# Patient Record
Sex: FEMALE | URBAN - METROPOLITAN AREA
[De-identification: names, ages, dates, MRNs, and addresses within clinical notes are randomized per-mention and may not be internally consistent; named-entity substitution may affect disease eponyms.]

---

## 2024-09-26 ENCOUNTER — OFFICE VISIT (OUTPATIENT)
Dept: URGENT CARE | Age: 19
End: 2024-09-26
Payer: COMMERCIAL

## 2024-09-26 VITALS
HEART RATE: 80 BPM | TEMPERATURE: 98.3 F | DIASTOLIC BLOOD PRESSURE: 58 MMHG | RESPIRATION RATE: 18 BRPM | BODY MASS INDEX: 35.82 KG/M2 | OXYGEN SATURATION: 98 % | WEIGHT: 215 LBS | SYSTOLIC BLOOD PRESSURE: 110 MMHG | HEIGHT: 65 IN

## 2024-09-26 DIAGNOSIS — L72.0 EIC (EPIDERMAL INCLUSION CYST): Primary | ICD-10-CM

## 2024-09-26 RX ORDER — SULFAMETHOXAZOLE AND TRIMETHOPRIM 800; 160 MG/1; MG/1
1 TABLET ORAL 2 TIMES DAILY
Qty: 20 TABLET | Refills: 0 | Status: SHIPPED | OUTPATIENT
Start: 2024-09-26 | End: 2024-10-06

## 2024-09-26 RX ORDER — MUPIROCIN 20 MG/G
OINTMENT TOPICAL 3 TIMES DAILY
Qty: 22 G | Refills: 0 | Status: SHIPPED | OUTPATIENT
Start: 2024-09-26 | End: 2024-10-06

## 2024-09-26 ASSESSMENT — PAIN SCALES - GENERAL: PAINLEVEL: 7

## 2024-09-26 NOTE — PROGRESS NOTES
"Subjective   Patient ID: Xiomara Dave is a 19 y.o. female. They present today with a chief complaint of Cyst (Cyst 2 days near left side of ribs). Patient presents with a 2-3 day history of slightly painful cyst on left side of abdomen. No drainage is reported. She denies fevers. She is eating and drinking normally.    History of Present Illness  HPI    Past Medical History  Allergies as of 09/26/2024    (No Known Allergies)       (Not in a hospital admission)       History reviewed. No pertinent past medical history.    History reviewed. No pertinent surgical history.     reports that she has never smoked. She has never used smokeless tobacco.    Review of Systems  Review of Systems                               Objective    Vitals:    09/26/24 1448   BP: 110/58   BP Location: Left arm   Patient Position: Sitting   BP Cuff Size: Large adult   Pulse: 80   Resp: 18   Temp: 36.8 °C (98.3 °F)   TempSrc: Oral   SpO2: 98%   Weight: 97.5 kg (215 lb)   Height: 1.651 m (5' 5\")     Patient's last menstrual period was 09/12/2024 (approximate).    Physical Exam  Constitutional:       General: She is not in acute distress.     Appearance: Normal appearance. She is not ill-appearing.   Cardiovascular:      Pulses: Normal pulses.   Skin:     General: Skin is warm and dry.      Comments: Left lateral abdominal wall with erythematous tender pustule, no warmth or drainage, no surrounding erythema   Neurological:      Mental Status: She is alert.         Procedures    Point of Care Test & Imaging Results from this visit  No results found for this visit on 09/26/24.   No results found.    Diagnostic study results (if any) were reviewed by Mary Bernard MD.    Assessment/Plan   Allergies, medications, history, and pertinent labs/EKGs/Imaging reviewed by Mary Bernard MD.       Orders and Diagnoses  There are no diagnoses linked to this encounter.    Medical Admin Record      Patient disposition: Home    Electronically signed by " Mary Bernard MD  2:51 PM

## 2024-11-23 ENCOUNTER — OFFICE VISIT (OUTPATIENT)
Dept: URGENT CARE | Age: 19
End: 2024-11-23
Payer: COMMERCIAL

## 2024-11-23 ENCOUNTER — ANCILLARY PROCEDURE (OUTPATIENT)
Dept: URGENT CARE | Age: 19
End: 2024-11-23
Payer: COMMERCIAL

## 2024-11-23 VITALS
WEIGHT: 215 LBS | HEART RATE: 90 BPM | DIASTOLIC BLOOD PRESSURE: 77 MMHG | HEIGHT: 65 IN | TEMPERATURE: 99.1 F | SYSTOLIC BLOOD PRESSURE: 144 MMHG | OXYGEN SATURATION: 97 % | RESPIRATION RATE: 18 BRPM | BODY MASS INDEX: 35.82 KG/M2

## 2024-11-23 DIAGNOSIS — M79.671 RIGHT FOOT PAIN: Primary | ICD-10-CM

## 2024-11-23 DIAGNOSIS — M79.671 RIGHT FOOT PAIN: ICD-10-CM

## 2024-11-23 DIAGNOSIS — S93.601A RIGHT FOOT SPRAIN, INITIAL ENCOUNTER: ICD-10-CM

## 2024-11-23 PROCEDURE — 73630 X-RAY EXAM OF FOOT: CPT | Mod: RIGHT SIDE | Performed by: FAMILY MEDICINE

## 2024-11-23 NOTE — PATIENT INSTRUCTIONS
Elevate and rest foot as able   apply ice for 10 minutes several times a day  Ibuprofen or Aleve as discussed  Follow-up with PCP in 7 to 10 days if no improvement

## 2024-11-23 NOTE — PROGRESS NOTES
"Subjective   Patient ID: Xiomara Dave is a 19 y.o. female. They present today with a chief complaint of Foot Pain (Right foot, started x2 days. ).    History of Present Illness  HPI  Patient presents with pain along the lateral edge of her right foot for the past 2 days.  She denies any known injury.  No new exercises or activities.  Patient started a new job in which she is on her feet more.  She does not remember a moment in time when the pain began.  She has rested and elevated her foot and has taken Tylenol and ibuprofen with no relief.  No bruising or swelling has been seen.  No numbness or loss of sensation.  Past Medical History  Allergies as of 11/23/2024    (No Known Allergies)       (Not in a hospital admission)       No past medical history on file.    No past surgical history on file.     reports that she has never smoked. She has never used smokeless tobacco.    Review of Systems  Review of Systems                               Objective    Vitals:    11/23/24 1443   BP: 144/77   BP Location: Right arm   Patient Position: Sitting   Pulse: 90   Resp: 18   Temp: 37.3 °C (99.1 °F)   TempSrc: Oral   SpO2: 97%   Weight: 97.5 kg (215 lb)   Height: 1.651 m (5' 5\")     Patient's last menstrual period was 10/21/2024 (approximate).    Physical Exam  General: Vitals noted, no distress. Afebrile.   Vascular: Right lower extremity warm and dry with good peripheral pulses noted  Extremities: No peripheral edema.  Tenderness with no palpable deformity over lateral dorsal right foot corresponding to proximal fourth metatarsal.  Movement in toes and ankle show full range of motion and 5/5 strength equal bilaterally  Skin: No rash. No bruising or discoloration. No cuts or abrasions  Neuro: No focal neurologic deficits, NIH score of 0.    Procedures    Point of Care Test & Imaging Results from this visit  No results found for this visit on 11/23/24.   No results found.  X-ray right foot shows no fracture or " dislocation  Diagnostic study results (if any) were reviewed by Dung Duong MD.    Assessment/Plan   Allergies, medications, history, and pertinent labs/EKGs/Imaging reviewed by Dung Duong MD.     Medical Decision Making  At time of discharge patient was clinically well-appearing and HDS for outpatient management. The patient and/or family was educated regarding diagnosis, supportive care, OTC and Rx medications. The patient and/or family was given the opportunity to ask questions prior to discharge.  They verbalized understanding of my discussion of the plans for treatment, expected course, indications to return to  or seek further evaluation in ED, and the need for timely follow up as directed.   They were provided with a work/school excuse if requested.    Orders and Diagnoses  Diagnoses and all orders for this visit:  Right foot pain  -     XR foot right 3+ views; Future  Right foot sprain, initial encounter      Medical Admin Record      Patient disposition: Home    Electronically signed by Dugn Duong MD  3:16 PM

## 2025-06-18 ENCOUNTER — APPOINTMENT (OUTPATIENT)
Dept: PRIMARY CARE | Facility: CLINIC | Age: 20
End: 2025-06-18
Payer: COMMERCIAL

## 2025-06-18 VITALS
DIASTOLIC BLOOD PRESSURE: 81 MMHG | BODY MASS INDEX: 41.02 KG/M2 | SYSTOLIC BLOOD PRESSURE: 143 MMHG | TEMPERATURE: 98 F | HEART RATE: 79 BPM | OXYGEN SATURATION: 96 % | HEIGHT: 66 IN | RESPIRATION RATE: 16 BRPM | WEIGHT: 255.25 LBS

## 2025-06-18 DIAGNOSIS — E66.813 CLASS 3 SEVERE OBESITY WITH SERIOUS COMORBIDITY AND BODY MASS INDEX (BMI) OF 40.0 TO 44.9 IN ADULT, UNSPECIFIED OBESITY TYPE: Primary | ICD-10-CM

## 2025-06-18 DIAGNOSIS — Z00.00 ROUTINE GENERAL MEDICAL EXAMINATION AT A HEALTH CARE FACILITY: ICD-10-CM

## 2025-06-18 DIAGNOSIS — E28.2 PCOS (POLYCYSTIC OVARIAN SYNDROME): ICD-10-CM

## 2025-06-18 LAB — POC HEMOGLOBIN A1C: 5.2 % (ref 4.2–6.5)

## 2025-06-18 PROCEDURE — 1036F TOBACCO NON-USER: CPT

## 2025-06-18 PROCEDURE — 99385 PREV VISIT NEW AGE 18-39: CPT

## 2025-06-18 PROCEDURE — 83036 HEMOGLOBIN GLYCOSYLATED A1C: CPT

## 2025-06-18 PROCEDURE — 3008F BODY MASS INDEX DOCD: CPT

## 2025-06-18 RX ORDER — NORETHINDRONE ACETATE/ETHINYL ESTRADIOL AND FERROUS FUMARATE 1MG-20(21)
KIT ORAL
COMMUNITY
Start: 2025-06-09

## 2025-06-18 ASSESSMENT — ENCOUNTER SYMPTOMS
OCCASIONAL FEELINGS OF UNSTEADINESS: 0
LOSS OF SENSATION IN FEET: 0
DEPRESSION: 0

## 2025-06-18 ASSESSMENT — PATIENT HEALTH QUESTIONNAIRE - PHQ9
SUM OF ALL RESPONSES TO PHQ9 QUESTIONS 1 AND 2: 2
2. FEELING DOWN, DEPRESSED OR HOPELESS: SEVERAL DAYS
1. LITTLE INTEREST OR PLEASURE IN DOING THINGS: SEVERAL DAYS

## 2025-06-18 NOTE — PROGRESS NOTES
"Meredith Dave \"Marialuisa\" is a 19 y.o. adult who is here for Polycystic Ovary Syndrome (NP jeane today to discuss PCOS).      Concerns:  - PCOS. [facility], which showed low sex binding globullin. Patient has never had a period. Deny any abdominal cramping or pain. They also had transvaginal and transabdominal US which showed:  Uterus: 7.6 cm x 4.0 cm x 3.6 cm; anteverted    - Fibroids present: suspected fundal fibroid measuring 0.8 x 1.1 x 0.7 cm   - Endometrial polyp: no   - Endometrial thickness: 0.6 cm   Right Ovary: visualized; Length: 37 mm, Height: 25 mm, Width: 28 mm   Color flow: present   - Right ovary with multiple small follicles peripherally located   Left Ovary: visualized; Length: 36 mm, Height: 21 mm, Width: 22 mm   Color flow: present   - Left ovary with multiple small follicles peripherally located     Specialists that pt follows with: none    Preventative:  - Immunizations: UTD  - Mammograms: not indicated  - Pap smears: Never, not indicated yet at age 19  - Chlamydia/Gonorrhea testing: Defers  - 1 time Hep C testing: Defers  - 1 time HIV testing: Defers  - Dental care: UTD  - Vision care: UTD  - FamilyHx: mom with ovarian cysts, maternal grandma with diabetes, dad with HTN.  No family history of breast cancer or colon cancer.    Lifestyle:  - Occupation: Full Time Jeweler at [employer]  - Lives with a roommate, feels safe at home  - Diet: Has been cooking more at home, incorporating protein, 32 oz of water a day  - Exercise: Likes to walk for an hour a day  - Alcohol/tobacco/drugs: Denies smoking and drinking, occasional marijuana use  - Contraception: birth control, condoms  - Mood: Good  - Menstrual periods: Has never had a period\    Comprehensive Medical/Surgical/Social/Family History  Medical History[1]  Surgical History[2]  Social History     Social History Narrative    Not on file       Allergies and Medications  Patient has no " "known allergies.  Medications Ordered Prior to Encounter[3]    Objective   /81 (BP Location: Right arm, Patient Position: Sitting)   Pulse 79   Temp 36.7 °C (98 °F) (Temporal)   Resp 16   Ht 1.664 m (5' 5.5\")   Wt 116 kg (255 lb 4 oz)   SpO2 96%   BMI 41.83 kg/m²    PHYSICAL EXAM  Gen: Well appearing, in NAD  Eyes: EOMI  HEENT: MMM. TMs normal. Throat normal.  Heart: RRR, no murmurs  Lungs: No increased work of breathing, CTAB, on RA  GI: Soft, NTND, no guarding or rebound  Extremities: WWP, cap refill <2sec, no pitting edema in LE b/l  Neuro: Alert, symmetrical facies, moves all extremities equally  Skin: No rashes or lesions  Psych: Appropriate mood and affect    Assessment/Plan   - Patient has PCOS indicated by multiple follicles on the ovaries from ultrasound, hyperandrogenism, weight gain and inability to lose weight, amenorrhea. They were placed on combined OCP at [facility] that was started 1 week ago and is tolerating well, plan to continue medication treatment for PCOS.  Ordered A1c to evaluate for diabetes in patient with PCOS.  Ordered GLP-1 for weight gain.  Also referred to OB/GYN to establish care.  - Reviewed Social Determinants of health with patient. Discussed healthy lifestyle, including 150 minutes of physical activity per week.  - Ordered/Reviewed baseline labwork   - Will discuss with patient to have release of records from [facility], follow-up on immunizations and labs from there.  Patient showed me labs on MyChart with unremarkable lipid panel and CMP.  - Follow up in 1 month to evaluate how they are tolerating GLP-1         [1] History reviewed. No pertinent past medical history.  [2] History reviewed. No pertinent surgical history.  [3]   Current Outpatient Medications on File Prior to Visit   Medication Sig Dispense Refill    Essence Fe 1/20, 28, 1 mg-20 mcg (21)/75 mg (7) tablet Take 1 tablet by mouth 1 (one) time each day. Skip placebos to skip menses.       No " current facility-administered medications on file prior to visit.

## 2025-06-19 NOTE — PROGRESS NOTES
I saw and evaluated the patient. I personally obtained the key and critical portions of the history and physical exam or was physically present for key and critical portions performed by the resident/fellow. I reviewed the resident/fellow's documentation and discussed the patient with the resident/fellow. I agree with the resident/fellow's medical decision making as documented in the note.    Feliciano Phelps, DO

## 2025-07-07 ENCOUNTER — APPOINTMENT (OUTPATIENT)
Dept: OBSTETRICS AND GYNECOLOGY | Facility: CLINIC | Age: 20
End: 2025-07-07
Payer: COMMERCIAL

## 2025-07-07 VITALS
DIASTOLIC BLOOD PRESSURE: 72 MMHG | BODY MASS INDEX: 40.66 KG/M2 | WEIGHT: 253 LBS | SYSTOLIC BLOOD PRESSURE: 109 MMHG | HEIGHT: 66 IN

## 2025-07-07 DIAGNOSIS — Z30.41 ENCOUNTER FOR SURVEILLANCE OF CONTRACEPTIVE PILLS: ICD-10-CM

## 2025-07-07 DIAGNOSIS — E28.2 PCOS (POLYCYSTIC OVARIAN SYNDROME): Primary | ICD-10-CM

## 2025-07-07 PROCEDURE — 99204 OFFICE O/P NEW MOD 45 MIN: CPT | Performed by: OBSTETRICS & GYNECOLOGY

## 2025-07-07 PROCEDURE — 3008F BODY MASS INDEX DOCD: CPT | Performed by: OBSTETRICS & GYNECOLOGY

## 2025-07-07 PROCEDURE — 1036F TOBACCO NON-USER: CPT | Performed by: OBSTETRICS & GYNECOLOGY

## 2025-07-07 SDOH — ECONOMIC STABILITY: TRANSPORTATION INSECURITY
IN THE PAST 12 MONTHS, HAS THE LACK OF TRANSPORTATION KEPT YOU FROM MEDICAL APPOINTMENTS OR FROM GETTING MEDICATIONS?: NO

## 2025-07-07 SDOH — ECONOMIC STABILITY: FOOD INSECURITY: WITHIN THE PAST 12 MONTHS, THE FOOD YOU BOUGHT JUST DIDN'T LAST AND YOU DIDN'T HAVE MONEY TO GET MORE.: NEVER TRUE

## 2025-07-07 SDOH — HEALTH STABILITY: PHYSICAL HEALTH: ON AVERAGE, HOW MANY DAYS PER WEEK DO YOU ENGAGE IN MODERATE TO STRENUOUS EXERCISE (LIKE A BRISK WALK)?: 7 DAYS

## 2025-07-07 SDOH — ECONOMIC STABILITY: FOOD INSECURITY: WITHIN THE PAST 12 MONTHS, YOU WORRIED THAT YOUR FOOD WOULD RUN OUT BEFORE YOU GOT MONEY TO BUY MORE.: NEVER TRUE

## 2025-07-07 SDOH — HEALTH STABILITY: PHYSICAL HEALTH: ON AVERAGE, HOW MANY MINUTES DO YOU ENGAGE IN EXERCISE AT THIS LEVEL?: 60 MIN

## 2025-07-07 SDOH — ECONOMIC STABILITY: TRANSPORTATION INSECURITY
IN THE PAST 12 MONTHS, HAS LACK OF TRANSPORTATION KEPT YOU FROM MEETINGS, WORK, OR FROM GETTING THINGS NEEDED FOR DAILY LIVING?: NO

## 2025-07-07 ASSESSMENT — ENCOUNTER SYMPTOMS
LOSS OF SENSATION IN FEET: 0
OCCASIONAL FEELINGS OF UNSTEADINESS: 0
DEPRESSION: 0

## 2025-07-07 ASSESSMENT — LIFESTYLE VARIABLES: HOW OFTEN DO YOU HAVE A DRINK CONTAINING ALCOHOL: NEVER

## 2025-07-07 ASSESSMENT — PAIN SCALES - GENERAL: PAINLEVEL_OUTOF10: 2

## 2025-07-07 ASSESSMENT — SOCIAL DETERMINANTS OF HEALTH (SDOH): HOW HARD IS IT FOR YOU TO PAY FOR THE VERY BASICS LIKE FOOD, HOUSING, MEDICAL CARE, AND HEATING?: NOT HARD AT ALL

## 2025-07-07 NOTE — PROGRESS NOTES
"Annual Exam    Pap: Never  Sarah Beth: Never  LMP: 2025  CC: transvaginal usn last month - review. Discuss possible hyst & pelvic pain.    July Guerrero MA II    GYN visit - establish care     SUBJECTIVE    Xiomara Dave \"Marialuisa\" is a 19 y.o. adult who presents to establish care and discuss her periods.      She says that when she was 12, she had two separate events where she had 1 day of vaginal bleeding.  She was unsure at the time whether they were her period or not.  She saw a physician when she was 15 and had a workup for \"not having a period\" which she says came back normal.  She says it was attributed to her weight at the time.     She is sexually active in a relationship with a trans female partner at this time.  She started herself on OTC progesterone-only pills.  Shortly after starting the pills, she was having bleeding almost every day.  [facility] then [facility].  She had blood work done there and had normal FSH, TSH, prolactin levels.  She has also since had a pelvic USN which showed a normal appearing uterus other than a small fibroid (0.8 x 1.1 x 0.7 cm).  Her ovaries had a polycystic appearance.     She [facility] ultimately [facility] and has been taking those 1-2 weeks.     PMH - PCOS, anxiety/depression, HTN     PSH - none     OB history -   OB History    Para Term  AB Living   0 0 0 0 0 0   SAB IAB Ectopic Multiple Live Births   0 0 0 0 0       OBJECTIVE  /72 (BP Location: Right arm, Patient Position: Sitting)   Ht 1.664 m (5' 5.5\")   Wt 115 kg (253 lb)   LMP 2025 (Exact Date)   BMI 41.46 kg/m²     General Appearance   - consistent with stated age, well groomed and cooperative    Integumentary  - skin warm and dry without rash    Head and Neck  - normalocephalic and neck supple    Chest and Lung Exam  - normal breathing effort, no respiratory distress    ASSESSMENT/PLAN  19 y.o.  female who presents to establish care and discuss her " periods.     - Pt likely had menarche at age 12 and has been largely oligomenorrheic/amenorrheic since then likely due to obesity and PCOS.    - She has had workup for amenorrhea around age 15 and also at [facility] recently.   - Now on COCs but still taking first pack.   - She is not planning any pregnancies, but is currently sexually active with a trans female partner so does need contraception at this time.    - Discussed sterilization options.  Also discussed LARC options.   - Discussed hormonal IUD as option for contraception that would also offer endometrial protection for PCOs and anovulation.  She will think about this.  As she has just started taking COCs, will continue this for now.   - Discussed EMB procedure pending bleeding pattern on her COCs.  Follow up with me in 3 months for reassessment.  She will also be 20 at that time, so will plan to do screening pap as well.     Bibi Stark MD

## 2025-07-11 ENCOUNTER — APPOINTMENT (OUTPATIENT)
Dept: OBSTETRICS AND GYNECOLOGY | Facility: CLINIC | Age: 20
End: 2025-07-11
Payer: COMMERCIAL

## 2025-08-01 ENCOUNTER — PROCEDURE VISIT (OUTPATIENT)
Dept: PRIMARY CARE | Facility: CLINIC | Age: 20
End: 2025-08-01
Payer: COMMERCIAL

## 2025-08-01 VITALS
WEIGHT: 251.5 LBS | HEART RATE: 96 BPM | RESPIRATION RATE: 16 BRPM | BODY MASS INDEX: 41.22 KG/M2 | TEMPERATURE: 97.2 F | DIASTOLIC BLOOD PRESSURE: 82 MMHG | OXYGEN SATURATION: 96 % | SYSTOLIC BLOOD PRESSURE: 138 MMHG

## 2025-08-01 DIAGNOSIS — Z12.4 SCREENING FOR MALIGNANT NEOPLASM OF CERVIX: ICD-10-CM

## 2025-08-01 DIAGNOSIS — Z12.4 ENCOUNTER FOR PAPANICOLAOU SMEAR FOR CERVICAL CANCER SCREENING: Primary | ICD-10-CM

## 2025-08-01 DIAGNOSIS — Z01.419 ENCOUNTER FOR GYNECOLOGICAL EXAMINATION WITHOUT ABNORMAL FINDING: ICD-10-CM

## 2025-08-01 DIAGNOSIS — E28.2 PCOS (POLYCYSTIC OVARIAN SYNDROME): ICD-10-CM

## 2025-08-01 PROCEDURE — 88141 CYTOPATH C/V INTERPRET: CPT | Performed by: PATHOLOGY

## 2025-08-01 PROCEDURE — 87626 HPV SEP HI-RSK TYP&POOL RSLT: CPT

## 2025-08-01 PROCEDURE — 99213 OFFICE O/P EST LOW 20 MIN: CPT

## 2025-08-01 PROCEDURE — 88175 CYTOPATH C/V AUTO FLUID REDO: CPT

## 2025-08-01 NOTE — PROGRESS NOTES
"Meredith Dave \"Marialuisa\" is a 20 y.o. woman who comes in today for a pap smear only. This is her first annual gynecologic exam and pap smear. Previous abnormal Pap smears: no, never performed. Contraception: OCP (estrogen/progesterone). At last visit we discussed PCOS diagnosis. Today patient states that her menstrual periods are improved on OCP. Her first period had heavy bleeding for 3 days. LDMP end of July, with improved pain and bleeding. She has been taking ibuprofen and using a heating pad for pain which provides relief.    Objective   /82 (BP Location: Right arm, Patient Position: Sitting)   Pulse 96   Temp 36.2 °C (97.2 °F) (Temporal)   Resp 16   Wt 114 kg (251 lb 8 oz)   LMP 05/13/2025 (Exact Date)   SpO2 96%   BMI 41.22 kg/m²   Pelvic Exam: external genitalia normal, urethra without abnormality or discharge, perianal skin: no external genital warts noted, vagina normal without discharge, and cervix normal in appearance. Pap smear obtained.    Assessment/Plan   1. Encounter for Papanicolaou smear for cervical cancer screening (Primary)  - THINPREP PAP TEST    2. Encounter for gynecological examination without abnormal finding  - THINPREP PAP TEST    3. Screening for malignant neoplasm of cervix  - THINPREP PAP TEST    4. PCOS (polycystic ovarian syndrome)    - Continue OCP regimen for PCOS. Suggested NSAIDs/heating pad for menstrual pain.  - Will message patient with results  - Follow up in 5 years, or as indicated by Pap results.    Jessica New DO    "

## 2025-08-13 ENCOUNTER — RESULTS FOLLOW-UP (OUTPATIENT)
Dept: ORTHOPEDIC SURGERY | Facility: CLINIC | Age: 20
End: 2025-08-13
Payer: COMMERCIAL

## 2025-08-13 LAB
CYTOLOGY CMNT CVX/VAG CYTO-IMP: NORMAL
HPV HR 12 DNA GENITAL QL NAA+PROBE: NEGATIVE
HPV HR GENOTYPES PNL CVX NAA+PROBE: NEGATIVE
HPV16 DNA SPEC QL NAA+PROBE: NEGATIVE
HPV18 DNA SPEC QL NAA+PROBE: NEGATIVE
LAB AP HPV GENOTYPE QUESTION: YES
LAB AP HPV HR: NORMAL
LABORATORY COMMENT REPORT: NORMAL
PATH REPORT.TOTAL CANCER: NORMAL
RESIDENT REVIEW: NORMAL

## 2025-10-15 ENCOUNTER — APPOINTMENT (OUTPATIENT)
Dept: OBSTETRICS AND GYNECOLOGY | Facility: CLINIC | Age: 20
End: 2025-10-15
Payer: COMMERCIAL